# Patient Record
(demographics unavailable — no encounter records)

---

## 2025-04-24 NOTE — PLAN
[FreeTextEntry1] : - Thank you for seeing us today! You did a great job with your exam!  Vulvar hygiene recommendations:  - Take baths or soak in plain warm water several days a week (no bubbles, fragrances, or chemicals in the bath) - Avoid using soap or fragranced products on the vulva near the vaginal opening  - Pat the vulvar skin dry before applying any ointments  - Apply a bland moisturizing ointment (Vaseline or Aquaphor) every night to protect skin and reduce irritation - You may also apply Vaseline/Aquaphor at any other time (e.g. mornings, before/after physical activities)    - Rx sent for triamcinolone (medium potency) steroid ointment  - Use the topical steroid ointment as needed for itching or irritation   - Avoid any other topical treatments. Try to eliminate any irritants including fabric softener, fragranced laundry detergent  For bathroom hygiene: --Wash hands before AND after using the toilet --Sit on the toilet leaning forward and with legs spread apart --If using wet wipes: they must be non-fragranced, no harsh chemicals  --Gently pat dry between the labia, using small amount of toilet paper or wet wipe  --Then wipe the bottom/rear end separately   - Schedule follow-up in the office in about 3 months  ***** To contact the Pediatric & Adolescent Gynecology team: - phone: [phone] -- option 1 for appointments, option 2 for clinical questions - patient portal (available for ages <13 or 18+ through [employer] gasper/website) - email: [email or web address] (for non-urgent requests/records)   Office locations for Dr. King and Dr. Quiros: - [facility] (main office) [address], [address], [place] [address] - [facility] [place] (Dr. King only) [address], [place] [address] - [facility] [place] (Dr. Quiros only) [address], [address], [place] [address]

## 2025-04-24 NOTE — ASSESSMENT
[FreeTextEntry1] : -  LUIS DANIEL is a 7yo female with episodes of irritation in the genital area  She is overall healthy with no significant medical problems Luis Daniel did very well with her exam  On exam today, the skin appears dry, slightly pale in interlabial folds, and slightly irritated on distal aspects of labia majora  No signs of lichen sclerosus or other specific dermatitis, and no adhesions noted  Will treat for nonspecific dermatitis in the setting of dry skin Counseled on vulvar hygiene Prophylactic use of bland emollient ointment Use of medium potency steroid ointment as needed  Recommend follow-up in a few months to assess for changes that may indicate a specific diagnosis However if she is doing well at that point, may defer/postpone visit  Plan & recommendations shared with patient/family as below.  All questions were answered, and understanding was expressed. They were encouraged to contact us with additional questions or concerns.   Joya King MD Director, Pediatric & Adolescent Gynecology [facility] Office: [phone]

## 2025-04-24 NOTE — PLAN
[FreeTextEntry1] : - Thank you for seeing us today! You did a great job with your exam!  Vulvar hygiene recommendations:  - Take baths or soak in plain warm water several days a week (no bubbles, fragrances, or chemicals in the bath) - Avoid using soap or fragranced products on the vulva near the vaginal opening  - Pat the vulvar skin dry before applying any ointments  - Apply a bland moisturizing ointment (Vaseline or Aquaphor) every night to protect skin and reduce irritation - You may also apply Vaseline/Aquaphor at any other time (e.g. mornings, before/after physical activities)    - Rx sent for triamcinolone (medium potency) steroid ointment  - Use the topical steroid ointment as needed for itching or irritation   - Avoid any other topical treatments. Try to eliminate any irritants including fabric softener, fragranced laundry detergent  For bathroom hygiene: --Wash hands before AND after using the toilet --Sit on the toilet leaning forward and with legs spread apart --If using wet wipes: they must be non-fragranced, no harsh chemicals  --Gently pat dry between the labia, using small amount of toilet paper or wet wipe  --Then wipe the bottom/rear end separately   - Schedule follow-up in the office in about 3 months  ***** To contact the Pediatric & Adolescent Gynecology team: - phone: [phone] -- option 1 for appointments, option 2 for clinical questions - patient portal (available for ages <13 or 18+ through [employer] gasper/website) - email: [email or web address] (for non-urgent requests/records)   Office locations for Dr. King and Dr. Quiros: - [facility] (main office) [address], [address], [place] [address] - [facility] [place] (Dr. King only) [address], [place] [address] - [facility] [place] (Dr. Quiros only) [address], [address], [place] [address]

## 2025-04-24 NOTE — ASSESSMENT
[FreeTextEntry1] : -  LUIS DANIEL is a 7yo female with episodes of irritation in the genital area  She is overall healthy with no significant medical problems Luis Daniel did very well with her exam  On exam today, the skin appears dry, slightly pale in interlabial folds, and slightly irritated on distal aspects of labia majora  No signs of lichen sclerosus or other specific dermatitis, and no adhesions noted  Will treat for nonspecific dermatitis in the setting of dry skin Counseled on vulvar hygiene Prophylactic use of bland emollient ointment Use of medium potency steroid ointment as needed  Recommend follow-up in a few months to assess for changes that may indicate a specific diagnosis However if she is doing well at that point, may defer/postpone visit  Plan & recommendations shared with patient/family as below.  All questions were answered, and understanding was expressed. They were encouraged to contact us with additional questions or concerns.   Joya King MD Director, Pediatric & Adolescent Gynecology [facility] Office: [phone]

## 2025-04-24 NOTE — HISTORY OF PRESENT ILLNESS
[FreeTextEntry1] :  Pediatric & Adolescent Gynecology: New Patient Visit   LUIS DANIEL is a(n) 6 year female ( Aug 24 2018) presenting for vaginal pain.    Referred by: none PCP: CUONG GEORGE   Review of history from records: Seen by PCP Dr. Fleischer 25 for vaginal pain and anal discomfort  From note:   c/o vaginal pain  4/10 seen by other MD in office, still c/o vaginal pain that night   went to school, seemed fine, then fever to 101 that night, face was puffy. Pink swollen eyes, runny nose  lots of congestion, low grade fever   swelling in eye seemed to be going down, day prior was her usual self, other than runny nose, c/o headache   seemed fine in the am, mid-day c/o vaginal pain again. Motrin seems to help after 1 hr with pain   On exam noted to have mild irritation in the lower vaginal area, no rashes  Had stomach virus about 3 weeks prior    Today 2025: pt is here with mom  Mom said Luis Daniel has been having vaginal and anal discomfort for about 1 week and a half  Mom said Luis Daniel has had flare ups of vaginal sensitivity for a while now   Pain has woken her from sleep due to intensity   Mom said she does see a light pink 'halo' around her vagina  Appearance does not always correlate with her discomfort   Prior to this episode she was mostly taking showers  When this started up, mom started giving her baths (10 minutes with just water)   Mom said Luis Daniel alternates between constipation and loose stools   Symptoms: pain, mom says Luis Daniel has a hard time finding words for it  Mom said Luis Daniel has said it feels like ' a needle sewing up her butthole'  Duration and/or frequency seems to be worse at night, off and on rather than constant  Current or previous treatments:  Alleviating or exacerbating factors: warm soaks help 'half the time'   Additional history:  Mom shares that bad symptoms started 1.5 wks ago waking her from sleep  mom saw maybe some irritation, swelling, redness  but her reaction to it felt very big compared to what mom is seeing   and then when things were more mild, it would be uncomfortable but she could ignore it  then it would flare again at night Certain foods seemed to help - unclear why  and baths helped at times   It would be so severe at night at times that mom would call peds emergency line  that lasted for a few days  then they got to the point where it would go away for some time, then return a bit when she peed, or when sleeping  there seemed to be longer stretches of her feeling better though mom would notice that the skin would appear more pink even though Sera was feeling better  it also started in the setting of a virus with eye swelling / redness   last few days, things have been better  then this morning when voiding, burning in area of vagina & in anus  She tells parents that pain goes between those areas  BMs: varies between large/ firm, and loose  and mom notes the genital area (vagina, anus) have always seemed to be sensitive   two years ago she was first able to verbalize the first of these flare-ups  they went to peds and she was checked for UTI   (this happened around the time of some stool in underpants)  this seemed to pass within a week   but always since then, she will sometimes c/o pain in vaginal area  when it would flare, they would use witch hazel to clean, or bathe to soothe the skin  it would be a quick thing  sometimes when she complains, skin around anus will be very pink, as does the skin at bottom of vaginal opening  that area (perineum & perianal) is where she most indicates as area of pain   mom has questions when flares happen  is it a food sensitivity?  is there another GI issue?  there was strep in the house a couple weeks ago  but her throat swab was negative  she'd presented w/ similar symptoms a week before her brother brother's symptoms were worse so he got tested and was positive  mom's wondering, maybe Sera had it and cleared it  again, unclear if there is any association   They never saw bright / flaming red skin there  did see a 'halo' around vagina/ anus  sometimes skin looked dry or puckered   She had labial adhesions when much younger  they haven't seen these recently though yesterday when getting her into tub, skin seemed sticky like it was trying to adhere   She is otherwise healthy  no meds or allergies   Skin conditions: dry skin only  no dx of eczema gets dry skin bumps on elbows, not severe, comes & goes